# Patient Record
(demographics unavailable — no encounter records)

---

## 2025-01-06 NOTE — REASON FOR VISIT
[FreeTextEntry3] : Dear Dr. Lake        . I saw your patient LATASHA KAUR JR on 01/06/2025 . Please see the note below for the assessment and plan.   LATASHA KAUR JR  was seen  for an initial consultation at the Cardiogenomics Program at [facility] on 01/06/2025.   Mr. VINCENT FREED was referred by Dr. Lake for hereditary cardiac predisposition risk assessment and counseling, due to hx of arrhythmia

## 2025-01-06 NOTE — HISTORY OF PRESENT ILLNESS
[FreeTextEntry1] : LATASHA KAUR JR is a 56 yo M PMH palpitations, Hx NSVT 7 beats on stress test , event monitor with Vent Bigeminy   today he  is referred for a cardiogenomic evaluation

## 2025-03-20 NOTE — ASSESSMENT
[TextEntry] : LATASHA KAUR JR  is a 55 year M  with a history of palpitations, Hx NSVT 7 beats on stress test , event monitor with Vent Adry morton. The differences between hereditary and sporadic arrhythmia  were reviewed with the patient.  He  has elected to undergo genetic testing due to concerns for  personal history, definitive diagnosis, disease management, family history, concern for the health of family members . Results may change medical management for the patient and may affect the healthcare of other family members. He  expressed understanding of the presented information and satisfaction with having all of His questions and concerns addressed

## 2025-03-20 NOTE — FAMILY HISTORY
[FreeTextEntry1] : FamilyHistory_20_twCiteListControlStart FamilyHistory_20_twCiteListControlEnd [id number]  A four-generation family history was constructed and scanned into [employer].

## 2025-03-20 NOTE — PHYSICAL EXAM
[Normal] : without joint laxity or contractures [Tremor] : no tremor [de-identified] : 5/5 UE and LE strength

## 2025-03-20 NOTE — DISCUSSION/SUMMARY
[TextEntry] : We reviewed the risks, benefits, limitations, and implications of genetic testing.  Additionally, we examined the patients motivation for testing, and the emotional ramifications of the test results.  The patient is aware that results may impact family members.  Counseling resources are available if requested.   CARLOS MANUEL, the Genetic Information Non-discrimination Act protects most people from discrimination in health insurance and employment at firms with over 50 employees.  CARLOS MANUEL does not protect against use of genetic information by life insurance, disability, or long-term care insurers.  Further information on other limitations is available at www.[employer]istp.org.   After a review of testing options, the patient elected to the combined cardiac panel offered through [facility]. . Panels contain 138  genes associated with varying levels of risk for cardiomyopathy and arrhythmia. We reviewed the three possible results for each gene on this panel: positive, negative and variant of uncertain significance (VUS).  We discussed that panel testing could result in incidental findings, such as identifying a positive result in a gene with cardiac risks not seen in the current personal or discussed family history. If results are positive, we would discuss the  risks and management options associated with that cardiac condition susceptibility gene and discuss genetic testing for family members.  Pedigree was reviewed with the patient to identify those who should be tested if the patient is positive.  If results are negative or a VUS is identified, the patient would continue to be managed based on personal and family history of their  cardiac condition.

## 2025-03-20 NOTE — SIGNATURES
[TextEntry] : Yoel Proctor MD, PhD  Medical Director Program for Cardiac Genetics, Genomics and Precision Medicine Department of Cardiology [facility]  [employer] at [address] [place] [address] Tel: [phone] Fax: [phone]  ([facility] office) [address], 3rd floor (between [address]) [place] [address] (p) [phone] (f) [phone]

## 2025-03-20 NOTE — REASON FOR VISIT
[FreeTextEntry3] : Dear Dr. Lake        . I saw your patient LATASHA KAUR JR on 3/17/2025 . Please see the note below for the assessment and plan.   LATASHA KAUR JR  was seen  for an initial consultation at the Cardiogenomics Program at [facility] on 01/06/2025.   Mr. VINCENT FREED was referred by Dr. Lake for hereditary cardiac predisposition risk assessment and counseling, due to hx of arrhythmia

## 2025-03-20 NOTE — PLAN
[TextEntry] : 1.	Informed Consent obtained for the combined cardiac  sequencing and Del/Dup panel 138 genes (#865)   2.	Blood drawn today to be sent to [facility] for analysis, pending insurance authorization.  3.	 Mr. LATASHA KAUR JR  was provided an information sheet about his genetic testing.  4.	A follow-up appointment was scheduled in 2-3 months to discuss genetic testing results in person. Results generally return in 6-8 weeks.  For any additional questions please call  Kaitlin Jason MS, LAMBERTO or Yoel Proctor MD, PhD at [phone] Time spent counseling the patient during the visit was 60 min. >50% time spent in care coordination /counseling for discussion of cardiac risk and appropriate management.   case discussed with  with Kaitlin Jason MS, LAMBERTO for genetic counselling

## 2025-03-20 NOTE — PLAN
[TextEntry] : 1.	Informed Consent obtained for the combined cardiac  sequencing and Del/Dup panel 138 genes (#995)   2.	Blood drawn today to be sent to [facility] for analysis, pending insurance authorization.  3.	 Mr. LATASHA KAUR JR  was provided an information sheet about his genetic testing.  4.	A follow-up appointment was scheduled in 2-3 months to discuss genetic testing results in person. Results generally return in 6-8 weeks.  For any additional questions please call  Kaitlin Jason MS, LAMBERTO or Yoel Proctor MD, PhD at [phone] Time spent counseling the patient during the visit was 60 min. >50% time spent in care coordination /counseling for discussion of cardiac risk and appropriate management.   case discussed with  with Kaitlin Jason MS, LAMBERTO for genetic counselling

## 2025-03-20 NOTE — REASON FOR VISIT
[FreeTextEntry3] : Dear Dr. Lake        . I saw your patient LATASHA KAUR JR on 3/17/2025 . Please see the note below for the assessment and plan.   LATASHA KAUR JR  was seen  for an initial consultation at the Cardiogenomics Program at [facility] on 01/06/2025.   Mr. VINCENT FREED was referred by Dr. Lake for hereditary cardiac predisposition risk assessment and counseling, due to hx of arrhythmia

## 2025-03-20 NOTE — FAMILY HISTORY
[FreeTextEntry1] : FamilyHistory_20_twCiteListControlStart FamilyHistory_20_twCiteListControlEnd [id number]  A four-generation family history was constructed and scanned into [employer].

## 2025-03-20 NOTE — HISTORY OF PRESENT ILLNESS
[FreeTextEntry1] : no show 1/6/25 LATASHA KAUR JR is a 54 yo M PMH palpitations, Hx NSVT 7 beats on stress test , event monitor with Vent Bigeminy   today he  is referred for a cardiogenomic evaluation

## 2025-03-20 NOTE — PHYSICAL EXAM
[Normal] : without joint laxity or contractures [Tremor] : no tremor [de-identified] : 5/5 UE and LE strength

## 2025-03-20 NOTE — DISCUSSION/SUMMARY
[TextEntry] : We reviewed the risks, benefits, limitations, and implications of genetic testing.  Additionally, we examined the patients motivation for testing, and the emotional ramifications of the test results.  The patient is aware that results may impact family members.  Counseling resources are available if requested.   CARLOS MANUEL, the Genetic Information Non-discrimination Act protects most people from discrimination in health insurance and employment at firms with over 50 employees.  CARLOS MANUEL does not protect against use of genetic information by life insurance, disability, or long-term care insurers.  Further information on other limitations is available at www.[employer].org.   After a review of testing options, the patient elected to the combined cardiac panel offered through [facility] [employer] . Panels contain 138  genes associated with varying levels of risk for cardiomyopathy and arrhythmia. We reviewed the three possible results for each gene on this panel: positive, negative and variant of uncertain significance (VUS).  We discussed that panel testing could result in incidental findings, such as identifying a positive result in a gene with cardiac risks not seen in the current personal or discussed family history. If results are positive, we would discuss the  risks and management options associated with that cardiac condition susceptibility gene and discuss genetic testing for family members.  Pedigree was reviewed with the patient to identify those who should be tested if the patient is positive.  If results are negative or a VUS is identified, the patient would continue to be managed based on personal and family history of their  cardiac condition.

## 2025-03-20 NOTE — SIGNATURES
[TextEntry] : Yoel Proctor MD, PhD  Medical Director Program for Cardiac Genetics, Genomics and Precision Medicine Department of Cardiology [facility]  [employer] at [address] [place] [address] Tel: [phone] Fax: [phone]  ([facility] office) [address], 3rd floor (between [address]) [place] [address] (p) [phone] (f) [phone]